# Patient Record
Sex: FEMALE | Race: WHITE | NOT HISPANIC OR LATINO | URBAN - METROPOLITAN AREA
[De-identification: names, ages, dates, MRNs, and addresses within clinical notes are randomized per-mention and may not be internally consistent; named-entity substitution may affect disease eponyms.]

---

## 2017-04-19 NOTE — PATIENT DISCUSSION
LAZARUS OU:  PRESCRIBED UV PROTECTION TO SLOW GROWTH. PRESCRIBE ARTIFICAL TEARS TO INCREASE COMFORT.

## 2017-05-11 ENCOUNTER — IMPORTED ENCOUNTER (OUTPATIENT)
Dept: URBAN - METROPOLITAN AREA CLINIC 50 | Facility: CLINIC | Age: 82
End: 2017-05-11

## 2017-05-19 ENCOUNTER — IMPORTED ENCOUNTER (OUTPATIENT)
Dept: URBAN - METROPOLITAN AREA CLINIC 50 | Facility: CLINIC | Age: 82
End: 2017-05-19

## 2017-06-20 ENCOUNTER — IMPORTED ENCOUNTER (OUTPATIENT)
Dept: URBAN - METROPOLITAN AREA CLINIC 50 | Facility: CLINIC | Age: 82
End: 2017-06-20

## 2017-09-18 ENCOUNTER — IMPORTED ENCOUNTER (OUTPATIENT)
Dept: URBAN - METROPOLITAN AREA CLINIC 50 | Facility: CLINIC | Age: 82
End: 2017-09-18

## 2017-09-28 ENCOUNTER — IMPORTED ENCOUNTER (OUTPATIENT)
Dept: URBAN - METROPOLITAN AREA CLINIC 50 | Facility: CLINIC | Age: 82
End: 2017-09-28

## 2017-10-02 ENCOUNTER — IMPORTED ENCOUNTER (OUTPATIENT)
Dept: URBAN - METROPOLITAN AREA CLINIC 50 | Facility: CLINIC | Age: 82
End: 2017-10-02

## 2017-11-07 NOTE — PATIENT DISCUSSION
BLEPHARITIS, OU: PRESCRIBE WARM COMPRESSES AND EYELID SCRUBS QD-BID, ARTIFICIAL TEARS BID-QID. ERYTHROMYCIN DAYDAY QHS/PRN. RETURN FOR FOLLOW-UP AS SCHEDULED.

## 2017-11-07 NOTE — PATIENT DISCUSSION
New Prescription: erythromycin (erythromycin): ointment: 5 mg/gram (0.5 %) 1 a small amount at bedtime as needed on eyelid 11-

## 2017-11-07 NOTE — PATIENT DISCUSSION
DRY EYE SYNDROME OU: RX ARTIFICIAL TEARS AS NEEDED TO INCREASE COMFORT OU. IF SYMPTOMS PERSIST CONSIDER PUNCTAL PLUGS OR RESTASIS.

## 2018-02-12 ENCOUNTER — IMPORTED ENCOUNTER (OUTPATIENT)
Dept: URBAN - METROPOLITAN AREA CLINIC 50 | Facility: CLINIC | Age: 83
End: 2018-02-12

## 2018-02-16 ENCOUNTER — IMPORTED ENCOUNTER (OUTPATIENT)
Dept: URBAN - METROPOLITAN AREA CLINIC 50 | Facility: CLINIC | Age: 83
End: 2018-02-16

## 2018-02-16 NOTE — PATIENT DISCUSSION
"""Dry Eye Syndrome. Recommend lubrication with artificial tears and flax seed oil 1000mg orally twice a day. """

## 2018-02-19 ENCOUNTER — IMPORTED ENCOUNTER (OUTPATIENT)
Dept: URBAN - METROPOLITAN AREA CLINIC 50 | Facility: CLINIC | Age: 83
End: 2018-02-19

## 2018-02-26 NOTE — PATIENT DISCUSSION
POSTERIOR CAPSULAR FIBROSIS, OU OD&gt;OS:  VISUALLY SIGNIFICANT. OPTION OF YAG LASER VERSUS UPDATING GLASSES VERSUS FOLLOWING DISCUSSED. RBA'S DISCUSSED, PATIENT UNDERSTANDS AND DESIRES YAG LASER TO INCREASE VISION FOR DRIVING AND WATCHING TV.  SCHEDULE YAG LASER OD.

## 2018-04-23 ENCOUNTER — IMPORTED ENCOUNTER (OUTPATIENT)
Dept: URBAN - METROPOLITAN AREA CLINIC 50 | Facility: CLINIC | Age: 83
End: 2018-04-23

## 2018-06-08 ENCOUNTER — IMPORTED ENCOUNTER (OUTPATIENT)
Dept: URBAN - METROPOLITAN AREA CLINIC 50 | Facility: CLINIC | Age: 83
End: 2018-06-08

## 2018-09-14 ENCOUNTER — IMPORTED ENCOUNTER (OUTPATIENT)
Dept: URBAN - METROPOLITAN AREA CLINIC 50 | Facility: CLINIC | Age: 83
End: 2018-09-14

## 2018-09-14 NOTE — PATIENT DISCUSSION
"""Discussed cataract Sx OD."" ""Informed patient that their cataract is visually significant and meets the criteria for cataract surgery to increase their vision and decrease their glare symptoms.  RBALs of surgery discussed

## 2018-12-17 ENCOUNTER — IMPORTED ENCOUNTER (OUTPATIENT)
Dept: URBAN - METROPOLITAN AREA CLINIC 50 | Facility: CLINIC | Age: 83
End: 2018-12-17

## 2019-02-15 ENCOUNTER — IMPORTED ENCOUNTER (OUTPATIENT)
Dept: URBAN - METROPOLITAN AREA CLINIC 50 | Facility: CLINIC | Age: 84
End: 2019-02-15

## 2019-04-10 NOTE — PATIENT DISCUSSION
POSTERIOR CAPSULAR FIBROSIS, OS:  VISUALLY SIGNIFICANT. OPTION OF YAG LASER VERSUS UPDATING GLASSES VERSUS FOLLOWING DISCUSSED. RBA'S DISCUSSED, PATIENT UNDERSTANDS AND DESIRES YAG LASER TO INCREASE VISION FOR DRIVING AT NIGHT DUE TO GLARE FROM HEADLIGHTS. SCHEDULE YAG LASER OS.

## 2019-04-10 NOTE — PATIENT DISCUSSION
EYELID PTOSIS, OU: NO DIPLOPIA, ANISOCORIA OR FATIGUING SYMPTOMS. NOT VISUALLY SIGNIFICANT TO PATIENT. REFER TO OCULOPLASTIC SPECIALIST WHEN BOTHERSOME.

## 2019-05-13 ENCOUNTER — IMPORTED ENCOUNTER (OUTPATIENT)
Dept: URBAN - METROPOLITAN AREA CLINIC 50 | Facility: CLINIC | Age: 84
End: 2019-05-13

## 2019-05-14 ENCOUNTER — IMPORTED ENCOUNTER (OUTPATIENT)
Dept: URBAN - METROPOLITAN AREA CLINIC 50 | Facility: CLINIC | Age: 84
End: 2019-05-14

## 2019-05-17 ENCOUNTER — IMPORTED ENCOUNTER (OUTPATIENT)
Dept: URBAN - METROPOLITAN AREA CLINIC 50 | Facility: CLINIC | Age: 84
End: 2019-05-17

## 2019-05-20 ENCOUNTER — IMPORTED ENCOUNTER (OUTPATIENT)
Dept: URBAN - METROPOLITAN AREA CLINIC 50 | Facility: CLINIC | Age: 84
End: 2019-05-20

## 2019-05-28 NOTE — PATIENT DISCUSSION
New Prescription: erythromycin (erythromycin): ointment: 5 mg/gram (0.5 %) 1 a small amount at bedtime as directed on eyelid 05-

## 2019-08-19 ENCOUNTER — IMPORTED ENCOUNTER (OUTPATIENT)
Dept: URBAN - METROPOLITAN AREA CLINIC 50 | Facility: CLINIC | Age: 84
End: 2019-08-19

## 2019-10-18 ENCOUNTER — IMPORTED ENCOUNTER (OUTPATIENT)
Dept: URBAN - METROPOLITAN AREA CLINIC 50 | Facility: CLINIC | Age: 84
End: 2019-10-18

## 2019-11-08 ENCOUNTER — IMPORTED ENCOUNTER (OUTPATIENT)
Dept: URBAN - METROPOLITAN AREA CLINIC 50 | Facility: CLINIC | Age: 84
End: 2019-11-08

## 2020-01-10 ENCOUNTER — IMPORTED ENCOUNTER (OUTPATIENT)
Dept: URBAN - METROPOLITAN AREA CLINIC 50 | Facility: CLINIC | Age: 85
End: 2020-01-10

## 2020-05-06 NOTE — PATIENT DISCUSSION
The patient has mild cornea guttata, or early Fuch's endothelial dystrophy. Specular microscopy documented the cell count and morphology. The possibility that the condition could progress was explained. At this early stage observation and serial specular microscopy are indicated.

## 2020-05-19 ENCOUNTER — IMPORTED ENCOUNTER (OUTPATIENT)
Dept: URBAN - METROPOLITAN AREA CLINIC 50 | Facility: CLINIC | Age: 85
End: 2020-05-19

## 2020-05-20 ENCOUNTER — IMPORTED ENCOUNTER (OUTPATIENT)
Dept: URBAN - METROPOLITAN AREA CLINIC 50 | Facility: CLINIC | Age: 85
End: 2020-05-20

## 2020-07-08 ENCOUNTER — IMPORTED ENCOUNTER (OUTPATIENT)
Dept: URBAN - METROPOLITAN AREA CLINIC 50 | Facility: CLINIC | Age: 85
End: 2020-07-08

## 2020-07-13 ENCOUNTER — IMPORTED ENCOUNTER (OUTPATIENT)
Dept: URBAN - METROPOLITAN AREA CLINIC 50 | Facility: CLINIC | Age: 85
End: 2020-07-13

## 2020-08-07 ENCOUNTER — IMPORTED ENCOUNTER (OUTPATIENT)
Dept: URBAN - METROPOLITAN AREA CLINIC 50 | Facility: CLINIC | Age: 85
End: 2020-08-07

## 2020-12-28 ENCOUNTER — IMPORTED ENCOUNTER (OUTPATIENT)
Dept: URBAN - METROPOLITAN AREA CLINIC 50 | Facility: CLINIC | Age: 85
End: 2020-12-28

## 2021-01-20 ENCOUNTER — IMPORTED ENCOUNTER (OUTPATIENT)
Dept: URBAN - METROPOLITAN AREA CLINIC 50 | Facility: CLINIC | Age: 86
End: 2021-01-20

## 2021-04-10 ENCOUNTER — IMPORTED ENCOUNTER (OUTPATIENT)
Dept: URBAN - METROPOLITAN AREA CLINIC 50 | Facility: CLINIC | Age: 86
End: 2021-04-10

## 2021-04-18 ASSESSMENT — VISUAL ACUITY
OD_BAT: 20/60
OD_PH: 20/30
OS_CC: 20/100
OS_CC: 20/100-
OD_CC: 20/30-
OD_SC: 20/60
OD_CC: 20/50
OS_CC: 20/100
OD_PH: 20/30
OD_CC: 20/30-2
OD_OTHER: 20/60. 20/200.
OS_CC: 20/60-1
OS_PH: 20/100
OD_OTHER: 20/70. 20/80.
OS_SC: 20/100
OD_CC: 20/70+
OD_CC: 20/40-2
OS_PH: 20/70
OS_PH: 20/80
OS_CC: 20/100
OS_PH: 20/60-
OS_CC: J1@ 12 IN
OD_CC: 20/30
OD_CC: 20/40
OD_CC: 20/60
OS_SC: 20/100
OD_CC: J1@ 16 IN
OS_CC: 20/100-
OS_CC: 20/70-
OD_PH: 20/40
OS_CC: 20/80-
OS_CC: 20/100
OD_SC: 20/80
OD_BAT: 20/70
OS_CC: J1@ 16 IN
OD_CC: 20/40-
OD_CC: J1@ 12 IN
OD_PH: @ 12 IN

## 2021-04-18 ASSESSMENT — TONOMETRY
OS_IOP_MMHG: 08
OS_IOP_MMHG: 6
OS_IOP_MMHG: 08
OD_IOP_MMHG: 18
OD_IOP_MMHG: 16
OD_IOP_MMHG: 16
OS_IOP_MMHG: 11
OS_IOP_MMHG: 11
OD_IOP_MMHG: 20
OS_IOP_MMHG: 10
OS_IOP_MMHG: 09
OS_IOP_MMHG: 09
OD_IOP_MMHG: 15
OD_IOP_MMHG: 14
OS_IOP_MMHG: 8
OD_IOP_MMHG: 19
OD_IOP_MMHG: 16
OS_IOP_MMHG: 08
OD_IOP_MMHG: 13
OS_IOP_MMHG: 2
OD_IOP_MMHG: 19
OS_IOP_MMHG: 8
OD_IOP_MMHG: 14
OS_IOP_MMHG: 6
OD_IOP_MMHG: 13

## 2021-05-20 ENCOUNTER — PREPPED CHART (OUTPATIENT)
Dept: URBAN - METROPOLITAN AREA CLINIC 49 | Facility: CLINIC | Age: 86
End: 2021-05-20

## 2021-05-21 ENCOUNTER — COMPREHENSIVE EXAM (OUTPATIENT)
Dept: URBAN - METROPOLITAN AREA CLINIC 49 | Facility: CLINIC | Age: 86
End: 2021-05-21

## 2021-05-21 DIAGNOSIS — H04.123: ICD-10-CM

## 2021-05-21 DIAGNOSIS — H40.1133: ICD-10-CM

## 2021-05-21 DIAGNOSIS — H25.11: ICD-10-CM

## 2021-05-21 DIAGNOSIS — H43.811: ICD-10-CM

## 2021-05-21 PROCEDURE — 92014 COMPRE OPH EXAM EST PT 1/>: CPT

## 2021-05-21 PROCEDURE — 92015 DETERMINE REFRACTIVE STATE: CPT

## 2021-05-21 ASSESSMENT — VISUAL ACUITY
OU_SC: 20/70
OS_SC: 20/400
OD_SC: 20/70-1
OU_CC: J1
OD_PH: 20/60

## 2021-05-21 ASSESSMENT — TONOMETRY
OD_IOP_MMHG: 12
OS_IOP_MMHG: 2

## 2021-05-21 NOTE — PATIENT DISCUSSION
Controlled IOP OU. Continue Alphagan P 0.1% OD BID, Latanoprost OD QHS, Cosopt PF OD BID. 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

## 2022-07-21 ENCOUNTER — COMPREHENSIVE EXAM (OUTPATIENT)
Dept: URBAN - METROPOLITAN AREA CLINIC 49 | Facility: CLINIC | Age: 87
End: 2022-07-21

## 2022-07-21 DIAGNOSIS — H25.11: ICD-10-CM

## 2022-07-21 DIAGNOSIS — H04.123: ICD-10-CM

## 2022-07-21 DIAGNOSIS — H40.1133: ICD-10-CM

## 2022-07-21 DIAGNOSIS — H52.4: ICD-10-CM

## 2022-07-21 DIAGNOSIS — H43.811: ICD-10-CM

## 2022-07-21 PROCEDURE — 92014 COMPRE OPH EXAM EST PT 1/>: CPT

## 2022-07-21 PROCEDURE — 92133 CPTRZD OPH DX IMG PST SGM ON: CPT

## 2022-07-21 PROCEDURE — 92015 DETERMINE REFRACTIVE STATE: CPT

## 2022-07-21 PROCEDURE — 92134 CPTRZ OPH DX IMG PST SGM RTA: CPT

## 2022-07-21 ASSESSMENT — VISUAL ACUITY
OD_PH: 20/40
OD_CC: 20/70+2
OD_GLARE: 20/70
OD_GLARE: 20/400
OS_CC: 20/400
OU_CC: J3

## 2022-07-21 ASSESSMENT — TONOMETRY
OS_IOP_MMHG: 4
OD_IOP_MMHG: 26
OD_IOP_MMHG: 20

## 2022-07-21 NOTE — PATIENT DISCUSSION
Patient has currently been using Latanoprost OU Qhs only due to running out of other drops. Discussed to restart Alphagan BID OD and Cosopt OD BID. Patient has not been using any drops OS due to hypotony OS per Dr. Sanchez Vergara. Drop refills sent to preferred pharmacy today. Printed RX drop instruction for patent. OCT RNFL done for baseline today. Discussed with patient to return for HVF 30-2 Fast with IOP check with Dr. Brittaney Joseph.

## 2022-07-21 NOTE — PATIENT DISCUSSION
Hx of ECP OS and Trab OS. 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 Sy Terry at last exam. History of missed follow ups and noncompliance of drops.